# Patient Record
Sex: FEMALE | Race: WHITE
[De-identification: names, ages, dates, MRNs, and addresses within clinical notes are randomized per-mention and may not be internally consistent; named-entity substitution may affect disease eponyms.]

---

## 2017-07-06 ENCOUNTER — HOSPITAL ENCOUNTER (EMERGENCY)
Dept: HOSPITAL 74 - JER | Age: 37
LOS: 1 days | Discharge: HOME | End: 2017-07-07
Payer: SELF-PAY

## 2017-07-06 VITALS — BODY MASS INDEX: 26.2 KG/M2

## 2017-07-06 VITALS — HEART RATE: 67 BPM | SYSTOLIC BLOOD PRESSURE: 112 MMHG | TEMPERATURE: 98.6 F | DIASTOLIC BLOOD PRESSURE: 72 MMHG

## 2017-07-06 DIAGNOSIS — N34.2: Primary | ICD-10-CM

## 2017-07-06 LAB
ALBUMIN SERPL-MCNC: 3.9 G/DL (ref 3.4–5)
ALP SERPL-CCNC: 71 U/L (ref 45–117)
ALT SERPL-CCNC: 17 U/L (ref 12–78)
ANION GAP SERPL CALC-SCNC: 7 MMOL/L (ref 8–16)
APPEARANCE UR: CLEAR
AST SERPL-CCNC: 17 U/L (ref 15–37)
BASOPHILS # BLD: 0.7 % (ref 0–2)
BILIRUB SERPL-MCNC: 0.2 MG/DL (ref 0.2–1)
BILIRUB UR STRIP.AUTO-MCNC: NEGATIVE MG/DL
CALCIUM SERPL-MCNC: 9.2 MG/DL (ref 8.5–10.1)
CO2 SERPL-SCNC: 27 MMOL/L (ref 21–32)
COLOR UR: (no result)
CREAT SERPL-MCNC: 0.7 MG/DL (ref 0.55–1.02)
DEPRECATED RDW RBC AUTO: 14.2 % (ref 11.6–15.6)
EOSINOPHIL # BLD: 3.7 % (ref 0–4.5)
GLUCOSE SERPL-MCNC: 112 MG/DL (ref 74–106)
KETONES UR QL STRIP: NEGATIVE
LEUKOCYTE ESTERASE UR QL STRIP.AUTO: NEGATIVE
MCH RBC QN AUTO: 29.3 PG (ref 25.7–33.7)
MCHC RBC AUTO-ENTMCNC: 33.5 G/DL (ref 32–36)
MCV RBC: 87.6 FL (ref 80–96)
MUCOUS THREADS URNS QL MICRO: (no result)
NEUTROPHILS # BLD: 63.2 % (ref 42.8–82.8)
NITRITE UR QL STRIP: NEGATIVE
PH UR: 7.5 [PH] (ref 5–8)
PLATELET # BLD AUTO: 209 K/MM3 (ref 134–434)
PMV BLD: 10.9 FL (ref 7.5–11.1)
PROT SERPL-MCNC: 7.2 G/DL (ref 6.4–8.2)
PROT UR QL STRIP: NEGATIVE
PROT UR QL STRIP: NEGATIVE
RBC # BLD AUTO: 6 /HPF (ref 0–3)
RBC # UR STRIP: (no result) /UL
UROBILINOGEN UR STRIP-MCNC: (no result) E.U./DL (ref 0.2–1)
WBC # BLD AUTO: 9.2 K/MM3 (ref 4–10)
WBC # UR AUTO: 16 /HPF (ref 3–5)

## 2017-07-06 PROCEDURE — 3E0233Z INTRODUCTION OF ANTI-INFLAMMATORY INTO MUSCLE, PERCUTANEOUS APPROACH: ICD-10-PCS

## 2017-07-06 PROCEDURE — 3E0337Z INTRODUCTION OF ELECTROLYTIC AND WATER BALANCE SUBSTANCE INTO PERIPHERAL VEIN, PERCUTANEOUS APPROACH: ICD-10-PCS

## 2017-07-06 NOTE — PDOC
History of Present Illness





- General


Chief Complaint: Pain


Stated Complaint: PAIN, ACUTE


Time Seen by Provider: 07/06/17 21:06


History Source: Patient


Exam Limitations: No Limitations





- History of Present Illness


Travel History: No


Initial Comments: 





07/06/17 21:14





37yo Female patient with no significant past medical history presents to ED c/o 

RLQ/ Right Suprapubic pain with intermittent right flank discomfort which began 

last night and "slightly worsened." Patient reports no OTC medication use for 

pain. Associated frequent urination. Denies burning, Urgency, Pain, Discharge, 

Odor. Patient denies any other complaints at this time.


Timing/Duration: reports: getting worse


Quality: reports: moderate


Abdominal Pain Onset Location: reports: RLQ, suprapubic (right)


Pain Radiation: reports: flank


Activities at Onset: reports: exertion


Treatment  Prior to Arrive: improves with: analgesics


Aggravating Factors: improves with: None.  worse with: Defecation, Eating, 

Emotional upset, Exertion, [place], Movement, Voiding, Change in position


Alleviating Factors: worse with: None, Belching, Shallow Breathing, Defecation, 

Eating, Holding Breath, Passing Gas, Change in Position, Rest, Voiding, Vomiting





Past History





- Travel


Traveled outside of the country in the last 30 days: No


Close contact w/someone who was outside of country & ill: No





- Past Medical History


Allergies/Adverse Reactions: 


 Allergies











Allergy/AdvReac Type Severity Reaction Status Date / Time


 


No Known Allergies Allergy   Verified 07/06/17 20:48











Home Medications: 


Ambulatory Orders





Cephalexin Monohydrate [Keflex -] 500 mg PO BID #14 capsule 07/07/17 








Other medical history: Pt denies





- Surgical History


Abdominal Surgery: Yes (Tubal ligation)





- Psycho/Social/Smoking Cessation Hx


Suicidal Ideation: No


Smoking History: Never smoked


Have you smoked in the past 12 months: No


Information on smoking cessation initiated: No


Hx Alcohol Use: No


Drug/Substance Use Hx: No


Substance Use Type: None





Abd/GI Specific PMHX





- Complaint Specific PMHX


Colitis: No


Diverticulitis: No


Gall Bladder Disease: No


GERD: No


Hepatitis: No


Irritable Bowel Synd (IBS): No


Pancreatitis: No


GI Ulcer Disease: No





**Review of Systems





- Review of Systems


Able to Perform ROS?: Yes


Is the patient limited English proficient: No


Constitutional: No: Chills, Fever


ABD/GI: Yes: Abdominal cramping.  No: Constipated, Diarrhea, Nausea, Poor 

Appetite, Poor Fluid Intake, Rectal Bleeding, Vomiting


: Yes: Frequency.  No: Burning, Discharge, Flank Pain, Hematuria, Urgency


Musculoskeletal: Yes: Back Pain


All Other Systems: Reviewed and Negative





*Physical Exam





- Vital Signs


 Last Vital Signs











Temp Pulse Resp BP Pulse Ox


 


 98.6 F   67   18   112/72   100 


 


 07/06/17 20:49  07/06/17 20:49  07/06/17 20:49  07/06/17 20:49  07/06/17 20:49














- Physical Exam


General Appearance: Yes: Nourished, Appropriately Dressed.  No: Apparent 

Distress, Mild Distress, Moderate Distress, Severe Distress


Neck: positive: Trachea midline, Normal Thyroid, Supple.  negative: Rigid, 

Decreased range of motion, Stridor, Lymphadenopathy (R), Lymphadenopathy (L), 

Rigidity, Tender lateral, Tender midline


Respiratory/Chest: positive: Lungs Clear, Normal Breath Sounds.  negative: 

Chest Tender, Respiratory Distress, Accessory Muscle Use, Labored Respiration, 

Rapid RR, Rhonchi, Stridor, Wheezing


Cardiovascular: positive: Regular Rhythm, Regular Rate.  negative: Tachycardia


Gastrointestinal/Abdominal: positive: Normal Bowel Sounds, Tender, Soft, 

Distended, Rebound, Tenderness (Right suprapubic region/RLQ tenderness.).  

negative: Guarding


Musculoskeletal: positive: Normal Inspection.  negative: CVA Tenderness


Extremity: positive: Normal Capillary Refill, Normal Range of Motion.  negative

: Pedal Edema, Swelling, Calf Tenderness


Integumentary: positive: Normal Color, Dry, Warm


Neurologic: positive: CNs II-XII NML intact, Fully Oriented, Alert, Normal Mood/

Affect, Normal Response, Motor Strength 5/5





ED Treatment Course





- LABORATORY


CBC & Chemistry Diagram: 


 07/06/17 21:30





 07/06/17 21:30





- RADIOLOGY


Radiology Studies Ordered: 














 Category Date Time Status


 


 TRANSVAGINAL ULTRASOUND US [US] Stat Ultrasound  07/06/17 21:12 Ordered














*DC/Admit/Observation/Transfer


Diagnosis at time of Disposition: 


Urinary tract infection


Qualifiers:


 Urinary tract infection type: urethritis Qualified Code(s): N34.2 - Other 

urethritis





- Discharge Dispostion


Disposition: HOME


Condition at time of disposition: Stable


Admit: No





- Prescriptions


Prescriptions: 


Cephalexin Monohydrate [Keflex -] 500 mg PO BID #14 capsule





- Referrals


Referrals: 


Nile Cerda MD [Staff Physician] - 





- Patient Instructions


Printed Discharge Instructions:  DI for Urinary Tract Infection (UTI)


Additional Instructions: 


FOLLOW UP WITH YOUR PRIMARY CARE PROVIDER THIS WEEK. CALL TO SCHEDULE 

APPOINTMENT. FOLLOW UP WITH DR. CERDA (UROLOGY). TAKE MEDICATIONS AS 

PRESCRIBED. DRINK PLENTY FLUIDS. RETURN IF SYMPTOMS WORSEN OR ANY CONCERNS FOR 

FURTHER EVALUATION.


Print Language: ENGLISH





- Post Discharge Activity


Work/School Note:  Back to Work

## 2017-07-07 LAB — SP GR UR: 1.01 (ref 1–1.02)

## 2019-06-21 ENCOUNTER — HOSPITAL ENCOUNTER (EMERGENCY)
Dept: HOSPITAL 74 - JER | Age: 39
Discharge: HOME | End: 2019-06-21
Payer: COMMERCIAL

## 2019-06-21 VITALS — TEMPERATURE: 98.3 F

## 2019-06-21 VITALS — DIASTOLIC BLOOD PRESSURE: 62 MMHG | HEART RATE: 70 BPM | SYSTOLIC BLOOD PRESSURE: 121 MMHG

## 2019-06-21 VITALS — BODY MASS INDEX: 27.1 KG/M2

## 2019-06-21 DIAGNOSIS — R10.31: Primary | ICD-10-CM

## 2019-06-21 LAB
ALBUMIN SERPL-MCNC: 4.1 G/DL (ref 3.4–5)
ALP SERPL-CCNC: 61 U/L (ref 45–117)
ALT SERPL-CCNC: 21 U/L (ref 13–61)
ANION GAP SERPL CALC-SCNC: 4 MMOL/L (ref 8–16)
APPEARANCE UR: CLEAR
AST SERPL-CCNC: 18 U/L (ref 15–37)
BACTERIA # UR AUTO: 13.9 /HPF
BASOPHILS # BLD: 0.8 % (ref 0–2)
BILIRUB SERPL-MCNC: 0.7 MG/DL (ref 0.2–1)
BILIRUB UR STRIP.AUTO-MCNC: NEGATIVE MG/DL
BUN SERPL-MCNC: 14.1 MG/DL (ref 7–18)
CALCIUM SERPL-MCNC: 8.9 MG/DL (ref 8.5–10.1)
CASTS URNS QL MICRO: 1 /LPF (ref 0–8)
CHLORIDE SERPL-SCNC: 106 MMOL/L (ref 98–107)
CO2 SERPL-SCNC: 28 MMOL/L (ref 21–32)
COLOR UR: YELLOW
CREAT SERPL-MCNC: 0.7 MG/DL (ref 0.55–1.3)
DEPRECATED RDW RBC AUTO: 16 % (ref 11.6–15.6)
EOSINOPHIL # BLD: 4.4 % (ref 0–4.5)
EPITH CASTS URNS QL MICRO: 0.8 /HPF
GLUCOSE SERPL-MCNC: 96 MG/DL (ref 74–106)
HCT VFR BLD CALC: 35.2 % (ref 32.4–45.2)
HGB BLD-MCNC: 11.6 GM/DL (ref 10.7–15.3)
INR BLD: 1 (ref 0.83–1.09)
KETONES UR QL STRIP: NEGATIVE
LEUKOCYTE ESTERASE UR QL STRIP.AUTO: NEGATIVE
LYMPHOCYTES # BLD: 31.4 % (ref 8–40)
MCH RBC QN AUTO: 27.9 PG (ref 25.7–33.7)
MCHC RBC AUTO-ENTMCNC: 32.9 G/DL (ref 32–36)
MCV RBC: 84.9 FL (ref 80–96)
MONOCYTES # BLD AUTO: 7.7 % (ref 3.8–10.2)
NEUTROPHILS # BLD: 55.7 % (ref 42.8–82.8)
NITRITE UR QL STRIP: NEGATIVE
PH UR: 8.5 [PH] (ref 5–8)
PLATELET # BLD AUTO: 263 K/MM3 (ref 134–434)
PMV BLD: 10.3 FL (ref 7.5–11.1)
POTASSIUM SERPLBLD-SCNC: 3.8 MMOL/L (ref 3.5–5.1)
PROT SERPL-MCNC: 7.5 G/DL (ref 6.4–8.2)
PROT UR QL STRIP: NEGATIVE
PROT UR QL STRIP: NEGATIVE
PT PNL PPP: 11.8 SEC (ref 9.7–13)
RBC # BLD AUTO: 1 /HPF (ref 0–4)
RBC # BLD AUTO: 4.14 M/MM3 (ref 3.6–5.2)
SODIUM SERPL-SCNC: 138 MMOL/L (ref 136–145)
SP GR UR: 1.01 (ref 1.01–1.03)
UROBILINOGEN UR STRIP-MCNC: 0.2 MG/DL (ref 0.2–1)
WBC # BLD AUTO: 4.5 K/MM3 (ref 4–10)
WBC # UR AUTO: 0 /HPF (ref 0–5)

## 2019-06-21 NOTE — PDOC
History of Present Illness





- General


Chief Complaint: Pain


Stated Complaint: LWR BACK PAIN


Time Seen by Provider: 06/21/19 11:32


History Source: Patient


Exam Limitations: No Limitations





Past History





- Travel


Traveled outside of the country in the last 30 days: No


Close contact w/someone who was outside of country & ill: No





- Past Medical History


Allergies/Adverse Reactions: 


 Allergies











Allergy/AdvReac Type Severity Reaction Status Date / Time


 


No Known Allergies Allergy   Verified 06/21/19 10:54











Home Medications: 


Ambulatory Orders





Cyclobenzaprine HCl [Flexeril -] 10 mg PO HS #10 tablet 06/21/19 


Ibuprofen 600 mg PO Q6H #30 tablet 06/21/19 








COPD: No





- Surgical History


Abdominal Surgery: Yes (Tubal ligation)





- Suicide/Smoking/Psychosocial Hx


Smoking History: Never smoked


Have you smoked in the past 12 months: No


Hx Alcohol Use: No


Drug/Substance Use Hx: No


Substance Use Type: None





**Review of Systems





- Review of Systems


Able to Perform ROS?: Yes


Comments:: 





06/21/19 13:20


CONSTITUTIONAL: 


Absent: fever, chills, diaphoresis, generalized weakness, malaise, loss of 

appetite


HEENT: 


Absent: rhinorrhea, nasal congestion, throat pain, throat swelling, difficulty 

swallowing, mouth swelling, ear pain, eye pain, visual Changes


CARDIOVASCULAR: 


Absent: chest pain, loss of consciousness, palpitations, irregular heart rate, 

peripheral edema


RESPIRATORY: 


Absent: cough, shortness of breath, dyspnea with exertion, orthopnea, wheezing, 

stridor, hemoptysis


GASTROINTESTINAL:


Absent: abdominal pain, abdominal distension, nausea, vomiting, diarrhea, 

constipation, melena, hematochezia


GENITOURINARY: 


Present: R flank pain Absent: dysuria, frequency, urgency, hesitancy, hematuria

,  genital pain


MUSCULOSKELETAL: 


Absent: myalgia, arthralgia, joint swelling


SKIN: 


Absent: rash, itching, pallor


HEMATOLOGIC/IMMUNOLOGIC: 


Absent: easy bleeding, easy bruising, lymphadenopathy, frequent infections


ENDOCRINE:


Absent: unexplained weight gain, unexplained weight loss, heat intolerance, 

cold intolerance


NEUROLOGIC: 


Absent: headache, focal weakness or paresthesias, dizziness, unsteady gait, 

seizure, mental status changes, bladder or bowel incontinence


PSYCHIATRIC: 


Absent: anxiety, depression, suicidal or homicidal ideation, hallucinations.


Is the patient limited English proficient: No





*Physical Exam





- Vital Signs


 Last Vital Signs











Temp Pulse Resp BP Pulse Ox


 


 98.3 F   63   16   116/56 L  100 


 


 06/21/19 10:56  06/21/19 10:56  06/21/19 10:56  06/21/19 10:56  06/21/19 10:56














- Physical Exam


Comments: 





06/21/19 13:21


GENERAL:


Well developed, well nourished. Awake and alert. No acute distress.


HEENT:


Normocephalic, atraumatic. PERRLA, EOMI. No conjunctival pallor. Sclera are non-

icteric. Moist mucous membranes. Oropharynx is clear.


NECK: 


Supple. Full ROM. No JVD. Carotid pulses 2+ and symmetric, without bruits. No 

thyromegaly. No lymphadenopathy.


CARDIOVASCULAR:


Regular rate and rhythm. No murmurs, rubs, or gallops. Distal pulses are 2+ and 

symmetric. 


PULMONARY: 


No evidence of respiratory distress. Lungs clear to auscultation bilaterally. 

No wheezing, rales or rhonchi.


ABDOMINAL:


TTP of the RUQ with light and deep palpation. Soft. Non-distended. No rebound 

or guarding. No organomegaly. Normoactive bowel sounds. 


MUSCULOSKELETAL 


TTP of the R parapspinous muscles from L-L4. Normal range of motion at all 

joints. No bony deformities or tenderness. No CVA tenderness.


EXTREMITIES: 


No cyanosis. No clubbing. No edema. No calf tenderness.


SKIN: 


Warm and dry. Normal capillary refill. No rashes. No jaundice. 


NEUROLOGICAL: 


Alert, awake, appropriate. Cranial nerves 2-12 intact. No deficits to light 

touch and temperature in face, upper extremities and lower extremities. No 

motor deficits in the in face, upper extremities and lower extremities. 

Normoreflexic in the upper and lower extremities. Normal speech. Toes are down-

going bilaterally. Gait is normal without ataxia.


PSYCHIATRIC: 


Cooperative. Good eye contact. Appropriate mood and affect.





ED Treatment Course





- LABORATORY


CBC & Chemistry Diagram: 


 06/21/19 12:00





 06/21/19 12:00





- ADDITIONAL ORDERS


Additional order review: 


 Laboratory  Results











  06/21/19





  10:58


 


Urine Color  Yellow


 


Urine Appearance  Clear


 


Urine pH  8.5 H


 


Ur Specific Gravity  1.013


 


Urine Protein  Negative


 


Urine Glucose (UA)  Negative


 


Urine Ketones  Negative


 


Urine Blood  1+ H


 


Urine Nitrite  Negative


 


Urine Bilirubin  Negative


 


Urine Urobilinogen  0.2


 


Ur Leukocyte Esterase  Negative


 


Urine WBC (Auto)  0


 


Urine RBC (Auto)  1


 


Urine Casts (Auto)  1


 


U Epithel Cells (Auto)  0.8


 


Urine Bacteria (Auto)  13.9


 


Urine HCG, Qual  Negative














Medical Decision Making





- Medical Decision Making





06/21/19 13:23


Patient is a 38-year-old female no past medical history who presents to the ER 

today with rightsided flank pain/low back pain for 2 months. She states that 

the pain started gradually and got worse over the past two months. The patient 

states that she cannot lay on that side d/t pain. The pain is worse with 

movement. Pt states she works as a . She denies fevers, chills, 

dysuria, hematuria, frequency, urgency, saddle aneshtesia, bladder/bowel 

incontinence, nausea and vomiting





A/P: Low back pain/right upper quadrant pain 


On exam patient with right-sided flank pain and right upper quadrant pain. 

Positive Fleming sign. Negative CVA tenderness.


Patient states her pain is worse in the morning, muscular versus gallbladder?


Basic labs, urine, right upper quadrant ultrasound, Tylenol


Reevaluate





06/21/19 16:55


Labs are grossly normal except for mildly elevated liver enzymes


Pain resolved with Tylenol


Urine with 1+ blood, however, pt is currently on her menstrual. 


US gallbladder shows no evidence of angela.


Suspect MSK strain


DC home with PCP follow up


I discussed the physical exam findings, ancillary test results and final 

diagnoses with the patient. I answered all of the patient's questions. The 

patient was satisfied with the care received and felt comfortable with the 

discharge plan and treatment plan.  The Patient agrees to follow up with the 

primary care physician/specialist within 24-72 hours. Return precautions were 

given.





*DC/Admit/Observation/Transfer


Diagnosis at time of Disposition: 


 Flank pain








- Discharge Dispostion


Disposition: HOME


Condition at time of disposition: Stable


Decision to Admit order: No





- Prescriptions


Prescriptions: 


Cyclobenzaprine HCl [Flexeril -] 10 mg PO HS #10 tablet


Ibuprofen 600 mg PO Q6H #30 tablet





- Referrals


Referrals: 


Marshall Fajardo MD [Staff Physician] - 





- Patient Instructions


Printed Discharge Instructions:  DI for Flank Pain


Additional Instructions: 


You were evaluated for your flank (low back) pain today


Your blood work and urine were normal


Your ultrasound did not show any gall stones


Please take the Motrin and Flexeril as prescribed; do not drink or drive after 

taking the flexeril as it may make you sleepy


Follow up with your primary care doctor. A referral has been provided to you





Return to the ER for fever, vomiting, worsening pain despite treatment, pain 

when you pee, or if you have any changes in your symptoms





- Post Discharge Activity


Forms/Work/School Notes:  Back to Work

## 2020-12-22 ENCOUNTER — HOSPITAL ENCOUNTER (EMERGENCY)
Dept: HOSPITAL 74 - JERFT | Age: 40
Discharge: HOME | End: 2020-12-22
Payer: COMMERCIAL

## 2020-12-22 VITALS — HEART RATE: 85 BPM | DIASTOLIC BLOOD PRESSURE: 78 MMHG | TEMPERATURE: 97.8 F | SYSTOLIC BLOOD PRESSURE: 118 MMHG

## 2020-12-22 VITALS — BODY MASS INDEX: 28.3 KG/M2
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
## 2020-12-22 DIAGNOSIS — S81.812A: Primary | ICD-10-CM

## 2020-12-22 PROCEDURE — 0HQLXZZ REPAIR LEFT LOWER LEG SKIN, EXTERNAL APPROACH: ICD-10-PCS

## 2020-12-30 ENCOUNTER — HOSPITAL ENCOUNTER (EMERGENCY)
Dept: HOSPITAL 74 - JER | Age: 40
LOS: 1 days | Discharge: HOME | End: 2020-12-31
Payer: COMMERCIAL

## 2020-12-30 VITALS — TEMPERATURE: 97.8 F | HEART RATE: 68 BPM | SYSTOLIC BLOOD PRESSURE: 131 MMHG | DIASTOLIC BLOOD PRESSURE: 81 MMHG

## 2020-12-30 VITALS — BODY MASS INDEX: 28 KG/M2

## 2020-12-30 DIAGNOSIS — L03.116: ICD-10-CM

## 2020-12-30 DIAGNOSIS — S91.012A: Primary | ICD-10-CM

## 2020-12-30 NOTE — PDOC
History of Present Illness





- General


Chief Complaint: Pain


Stated Complaint: L FOOT PAIN


Time Seen by Provider: 12/30/20 22:19





- History of Present Illness


Initial Comments: 





12/30/20 23:32


HPI: 


40 y/po F with no pmh presenting with left lateral maleoulus wound concern for 

infection following a laceration. Patient was here 1 week ago after she dropped 

a knife that landed on her foot. She was given a tetanus shot and then derma

bonded with steri strips and DCd home. Today she is here for erythema around the

wound site, edema, and increased pain. 





Patient denies fever, chills, night sweats, HA, vision change, chest pain, pal

pitations, SOB, cough, leg swelling, abdominal pain, nausea, vomiting, diarrhea,

constipation, dysuria, hematuria, BPR, lightheadedness, weakness, sensory 

changes.





PMHx: as noted above


ROS: as noted


SHx: Denies tobacco use; no alcohol use; no rec drugs


Allergies: NKDA








ROS: 


GENERAL/CONSTITUTIONAL: No fever or chills. No weakness.


HEAD, EYES, EARS, NOSE AND THROAT: No change in vision. No ear pain or 

discharge. No sore throat.


CARDIOVASCULAR: No chest pain or shortness of breath


RESPIRATORY: No cough, wheezing, or hemoptysis.


GASTROINTESTINAL: No nausea, vomiting, diarrhea or constipation.


GENITOURINARY: No dysuria, frequency, or change in urination.


MUSCULOSKELETAL: No joint or muscle swelling or pain. No neck or back pain.


SKIN: No rash


NEUROLOGIC: No headache, vertigo, loss of consciousness, or change in 

strength/sensation.


ENDOCRINE: No increased thirst. No abnormal weight change


HEMATOLOGIC/LYMPHATIC: No anemia, easy bleeding, or history of blood clots.


ALLERGIC/IMMUNOLOGIC: No hives or skin allergy.








PE: 


GENERAL: Awake, alert, and fully oriented, no acute distress


HEAD: No signs of trauma, normocephalic, atraumatic 


EYES: EOMI, sclera anicteric, conjunctiva clear


ENT: Auricles normal inspection, hearing grossly normal, nares patent, 

oropharynx clear without exudates. Moist mucosa


NECK: Normal ROM, no lymphadenopathy


LUNGS: No increased work of breathing, symmetrical chest rise, clear to 

auscultation bilaterally, no wheezes, crackles or rhonchi 


HEART: Regular rate, regular rhythm, normal S1 and S2, no murmur, peripheral 

pulses 2+ and equal bilaterally. 


ABDOMEN: Soft, nondistended, nontender. No guarding, no rebound. No masses. No 

CVAT


MUSCULOSKELETAL: FROM


NEUROLOGICAL: Cranial nerves II through XII grossly intact. Normal speech, 

stable gait, no focal sensorimotor deficits 


SKIN: Warm, Dry, normal turgor, no rashes or lesions noted





12/31/20 07:55








Past History





- Medical History


Allergies/Adverse Reactions: 


                                    Allergies











Allergy/AdvReac Type Severity Reaction Status Date / Time


 


No Known Allergies Allergy   Verified 12/30/20 22:03











Home Medications: 


Ambulatory Orders





Cyclobenzaprine HCl [Flexeril -] 10 mg PO HS #10 tablet 06/21/19 


Ibuprofen 600 mg PO Q6H #30 tablet 06/21/19 


Clindamycin [Cleocin -] 450 mg PO TID 7 Days #63 capsule 12/30/20 








COPD: No





- Surgical History


Abdominal Surgery: Yes (Tubal ligation)





- Reproductive History


Is Patient Pregnant Now?: No





- Psycho-Social/Smoking History


Smoking History: Never smoked


Have you smoked in the past 12 months: No





*Physical Exam





- Vital Signs


                                Last Vital Signs











Temp Pulse Resp BP Pulse Ox


 


 97.8 F   68   18   131/81   99 


 


 12/30/20 22:01  12/30/20 22:01  12/30/20 22:01  12/30/20 22:01  12/30/20 22:01














ED Treatment Course





- RADIOLOGY


Radiology Studies Ordered: 














 Category Date Time Status


 


 ANKLE & FOOT-LEFT* [RAD] Stat Radiology  12/30/20 22:38 Taken














Discharge





- Discharge Information


Problems reviewed: Yes


Clinical Impression/Diagnosis: 


 Laceration of ankle, Cellulitis





Condition: Stable


Disposition: HOME





- Additional Discharge Information


Prescriptions: 


Clindamycin [Cleocin -] 450 mg PO TID 7 Days #63 capsule





- Follow up/Referral





- Patient Discharge Instructions


Patient Printed Discharge Instructions:  DI for Cellulitis -- Adult


Additional Instructions: 


Additional Instructions: 


Please return to the emergency department with any new or worsening symptoms or 

concerns including fever, worsening pain, pus drainage. 





Please follow up with the ER in 48 hours so we may re-evaluate the wound





Please take the antibiotic clindamycin 450mg three times a day for 7 days





You may take tylenol for pain control; follow the package instructions








- Post Discharge Activity

## 2020-12-30 NOTE — PDOC
History of Present Illness





- General


Chief Complaint: Pain


Stated Complaint: L FOOT PAIN


Time Seen by Provider: 12/30/20 22:19





- History of Present Illness


Initial Comments: 


40


12/30/20 22:19








Past History





- Medical History


Allergies/Adverse Reactions: 


                                    Allergies











Allergy/AdvReac Type Severity Reaction Status Date / Time


 


No Known Allergies Allergy   Verified 12/30/20 22:03











Home Medications: 


Ambulatory Orders





Cyclobenzaprine HCl [Flexeril -] 10 mg PO HS #10 tablet 06/21/19 


Ibuprofen 600 mg PO Q6H #30 tablet 06/21/19 








COPD: No





- Surgical History


Abdominal Surgery: Yes (Tubal ligation)





- Reproductive History


Is Patient Pregnant Now?: No





- Psycho-Social/Smoking History


Smoking History: Never smoked


Have you smoked in the past 12 months: No





*Physical Exam





- Vital Signs


                                Last Vital Signs











Temp Pulse Resp BP Pulse Ox


 


 97.8 F   68   18   131/81   99 


 


 12/30/20 22:01  12/30/20 22:01  12/30/20 22:01  12/30/20 22:01  12/30/20 22:01

## 2020-12-30 NOTE — PDOC
Attending Attestation





- Resident


Resident Name: Katrina Jane





- ED Attending Attestation


I have performed the following: I have examined & evaluated the patient, The 

case was reviewed & discussed with the resident, I agree w/resident's findings &

plan





- HPI


HPI: 





12/30/20 23:20


see resident hpi





- Physicial Exam


PE: 





12/30/20 23:20


see resident exam





- Medical Decision Making





12/30/20 23:21


40-year-old female status post laceration to the left lateral ankle with gluing 

and Steri-Strips complaining of increasing pain and swelling in the area


X-ray shows no obvious bony abnormality


Will DC with clindamycin, 48-hour wound check








Discharge





- Discharge Information


Problems reviewed: Yes


Clinical Impression/Diagnosis: 


 Laceration of ankle








- Follow up/Referral





- Patient Discharge Instructions





- Post Discharge Activity

## 2020-12-30 NOTE — XMS
Summarization of Episode Note

                          Created on:2020



Patient:TOMMY HORTON

Sex:Female

:1980

External Reference #:[id number]





Demographics







                          Address                   [address]



                                                    [place] [address]

 

                          Home Phone                [phone]

 

                          Phone                     [phone]

 

                          Email Address             [email or web address]

 

                          Preferred Language        English

 

                          Marital Status             or 

 

                          Holiness Affiliation     [place]

 

                          Race                      [facility]

 

                          Ethnic Group               or 









Author







                          Organization              [facility]









Support







                Name            Relationship    Address         Phone

 

                SE              Unavailable     Unavailable     Unavailable

 

                MUNIR MONSIVAIS PARTNER         [address] [phone]



                                                [place] [address] 

 

                MUNIR MONSIVAIS Unavailable     [address] Unavailable



                                                [place] [address] 









Re-disclosure Warning

The records that you are about to access may contain information from federally-
assisted alcohol or drug abuse programs. If such information is present, then 
the following federally mandated warning applies: This information has been 
disclosed to you from records protected by federal confidentiality rules (42 CFR
part 2). The federal rules prohibit you from making any further disclosure of 
this information unless further disclosure is expressly permitted by the written
consent of the person to whom it pertains or as otherwise permitted by 42 CFR 
part 2. A general authorization for the release of medical or other information 
is NOT sufficient for this purpose. The Federal rules restrict any use of the 
information to criminally investigate or prosecute any alcohol or drug abuse 
patient.The records that you are about to access may contain highly sensitive 
health information, the redisclosure of which is protected by Article 27-F of 
the [facility] law. If you continue you may haveaccess to 
information: Regarding HIV / AIDS; Provided by facilities licensed or operated 
by the [facility]; or Provided by the [facility]
Office for People With Developmental Disabilities. If such information is 
present, then the following [place] mandated warning applies: This 
information has been disclosed to you from confidential records which are 
protected by state law. State law prohibits you from making any further 
disclosure of this information without the specific written consent of the 
person to whom it pertains, or as otherwise permitted by law. Any unauthorized 
further disclosure in violation of state law may result in a fine or intermediate 
sentence or both. A general authorization for the release of medical or other 
information is NOT sufficient authorization for further disclosure.



Results







                    ID                  Date                Data Source

 

                    [id number]           10/10/2020 12:00:00 AM EDT [facility]











          Name      Value     Range     Interpretation Code Description Data Kacy

rce(s) Supporting



                                                                      Document(s

)

 

          2019-nCoV                                         [facility]    



          RNA XXX                                                     



          JUAN+probe-                                                   



          Imp                                                         









                                        This lab was ordered by OCHOA R[facility] YON

[id number] and reported by



                                        [employer]







Insurance Providers







          Payer name Policy type / Policy ID Covered   Covered party's Policy   

 Plan



                    Coverage type           party ID  relationship to [employer]    

Information



                                                  [employer]

## 2021-01-01 ENCOUNTER — HOSPITAL ENCOUNTER (EMERGENCY)
Dept: HOSPITAL 74 - JER | Age: 41
Discharge: HOME | End: 2021-01-01
Payer: COMMERCIAL

## 2021-01-01 VITALS — BODY MASS INDEX: 26.6 KG/M2

## 2021-01-01 VITALS — HEART RATE: 68 BPM | DIASTOLIC BLOOD PRESSURE: 73 MMHG | SYSTOLIC BLOOD PRESSURE: 120 MMHG | TEMPERATURE: 98.2 F

## 2021-01-01 DIAGNOSIS — Z48.00: Primary | ICD-10-CM

## 2023-08-28 ENCOUNTER — HOSPITAL ENCOUNTER (EMERGENCY)
Dept: HOSPITAL 74 - JER | Age: 43
Discharge: HOME | End: 2023-08-28
Payer: COMMERCIAL

## 2023-08-28 VITALS
HEART RATE: 56 BPM | DIASTOLIC BLOOD PRESSURE: 61 MMHG | TEMPERATURE: 98.8 F | SYSTOLIC BLOOD PRESSURE: 106 MMHG | RESPIRATION RATE: 18 BRPM

## 2023-08-28 VITALS — BODY MASS INDEX: 23.1 KG/M2

## 2023-08-28 DIAGNOSIS — R10.30: Primary | ICD-10-CM

## 2023-08-28 DIAGNOSIS — R39.15: ICD-10-CM

## 2023-08-28 DIAGNOSIS — R35.0: ICD-10-CM

## 2023-08-28 LAB
ALBUMIN SERPL-MCNC: 3.7 G/DL (ref 3.4–5)
ALP SERPL-CCNC: 50 U/L (ref 45–117)
ALT SERPL-CCNC: 22 U/L (ref 13–61)
ANION GAP SERPL CALC-SCNC: 5 MMOL/L (ref 8–16)
APPEARANCE UR: CLEAR
AST SERPL-CCNC: 21 U/L (ref 15–37)
BASOPHILS # BLD: 0.6 % (ref 0–2)
BILIRUB SERPL-MCNC: 0.6 MG/DL (ref 0.2–1)
BILIRUB UR STRIP.AUTO-MCNC: NEGATIVE MG/DL
BUN SERPL-MCNC: 13 MG/DL (ref 7–18)
CALCIUM SERPL-MCNC: 9 MG/DL (ref 8.5–10.1)
CHLORIDE SERPL-SCNC: 108 MMOL/L (ref 98–107)
CO2 SERPL-SCNC: 26 MMOL/L (ref 21–32)
COLOR UR: YELLOW
CREAT SERPL-MCNC: 0.7 MG/DL (ref 0.55–1.3)
DEPRECATED RDW RBC AUTO: 13.5 % (ref 11.6–15.6)
EOSINOPHIL # BLD: 2.6 % (ref 0–4.5)
GLUCOSE SERPL-MCNC: 94 MG/DL (ref 74–106)
HCT VFR BLD CALC: 34.1 % (ref 32.4–45.2)
HGB BLD-MCNC: 11.5 GM/DL (ref 10.7–15.3)
KETONES UR QL STRIP: NEGATIVE
LEUKOCYTE ESTERASE UR QL STRIP.AUTO: NEGATIVE
LYMPHOCYTES # BLD: 15.4 % (ref 8–40)
MCH RBC QN AUTO: 29.3 PG (ref 25.7–33.7)
MCHC RBC AUTO-ENTMCNC: 33.7 G/DL (ref 32–36)
MCV RBC: 86.9 FL (ref 80–96)
MONOCYTES # BLD AUTO: 9.2 % (ref 3.8–10.2)
NEUTROPHILS # BLD: 72.2 % (ref 42.8–82.8)
NITRITE UR QL STRIP: NEGATIVE
PH UR: 8.5 [PH] (ref 5–8)
PLATELET # BLD AUTO: 199 10^3/UL (ref 134–434)
PMV BLD: 10 FL (ref 7.5–11.1)
POTASSIUM SERPLBLD-SCNC: 4.2 MMOL/L (ref 3.5–5.1)
PROT SERPL-MCNC: 7 G/DL (ref 6.4–8.2)
PROT UR QL STRIP: NEGATIVE
PROT UR QL STRIP: NEGATIVE
RBC # BLD AUTO: 3.93 M/MM3 (ref 3.6–5.2)
SODIUM SERPL-SCNC: 140 MMOL/L (ref 136–145)
SP GR UR: 1.01 (ref 1.01–1.03)
UROBILINOGEN UR STRIP-MCNC: 0.2 MG/DL (ref 0.2–1)
WBC # BLD AUTO: 8.1 K/MM3 (ref 4–10)

## 2023-08-28 PROCEDURE — 3E033NZ INTRODUCTION OF ANALGESICS, HYPNOTICS, SEDATIVES INTO PERIPHERAL VEIN, PERCUTANEOUS APPROACH: ICD-10-PCS
